# Patient Record
Sex: MALE | Race: WHITE | URBAN - METROPOLITAN AREA
[De-identification: names, ages, dates, MRNs, and addresses within clinical notes are randomized per-mention and may not be internally consistent; named-entity substitution may affect disease eponyms.]

---

## 2023-05-02 ENCOUNTER — OFFICE VISIT (OUTPATIENT)
Dept: URBAN - METROPOLITAN AREA CLINIC 81 | Facility: CLINIC | Age: 68
End: 2023-05-02
Payer: MEDICARE

## 2023-05-02 PROCEDURE — 99203 OFFICE O/P NEW LOW 30 MIN: CPT | Performed by: OPTOMETRIST

## 2023-05-02 RX ORDER — DOXYCYCLINE HYCLATE 100 MG/1
100 MG CAPSULE, GELATIN COATED ORAL
Qty: 60 | Refills: 0 | Status: INACTIVE
Start: 2023-05-02 | End: 2023-05-03

## 2023-05-02 RX ORDER — TOBRAMYCIN AND DEXAMETHASONE 3; 1 MG/ML; MG/ML
SUSPENSION/ DROPS OPHTHALMIC
Qty: 5 | Refills: 1 | Status: ACTIVE
Start: 2023-05-02

## 2023-05-02 ASSESSMENT — INTRAOCULAR PRESSURE
OD: 18
OS: 16

## 2023-05-02 NOTE — IMPRESSION/PLAN
Impression: Hordeolum internum right upper eyelid: H00.021.
-MGD OU, severe allergy symptoms
-active discharge from st today Plan: Discussed diagnosis with patient in detail. Eyelid hygiene with lid scrubs or diluted tear free Jigar and Jigar baby shampoo. Warm compresses BID-QID as directed. Start Tobradex OD BID x 7 days, sample of Zylet dispensed to patient to start OD QID, start Doxycycline PO 100mg BID x 2 week then QAM x 2 weeks and D/C. Will continue to observe condition and/or symptoms. Patient instructed to call if condition gets worse.

## 2023-05-03 ENCOUNTER — OFFICE VISIT (OUTPATIENT)
Dept: URBAN - METROPOLITAN AREA CLINIC 76 | Facility: CLINIC | Age: 68
End: 2023-05-03

## 2023-05-03 DIAGNOSIS — L03.213 PRESEPTAL CELLULITIS: ICD-10-CM

## 2023-05-03 DIAGNOSIS — H00.021 HORDEOLUM INTERNUM RIGHT UPPER EYELID: Primary | ICD-10-CM

## 2023-05-03 PROCEDURE — 99212 OFFICE O/P EST SF 10 MIN: CPT | Performed by: OPTOMETRIST

## 2023-05-03 RX ORDER — AMOXICILLIN AND CLAVULANATE POTASSIUM 875; 125 MG/1; MG/1
TABLET, FILM COATED ORAL
Qty: 20 | Refills: 0 | Status: ACTIVE
Start: 2023-05-03

## 2023-05-03 RX ORDER — AMOXICILLIN AND CLAVULANATE POTASSIUM 500; 125 MG/1; 1/1
TABLET, FILM COATED ORAL
Qty: 20 | Refills: 0 | Status: INACTIVE
Start: 2023-05-03 | End: 2023-05-03

## 2023-05-03 RX ORDER — NEOMYCIN SULFATE, POLYMYXIN B SULFATE AND DEXAMETHASONE 3.5; 10000; 1 MG/G; [USP'U]/G; MG/G
OINTMENT OPHTHALMIC
Qty: 3.5 | Refills: 1 | Status: ACTIVE
Start: 2023-05-03

## 2023-05-03 ASSESSMENT — INTRAOCULAR PRESSURE
OS: 16
OD: 18

## 2023-05-03 NOTE — IMPRESSION/PLAN
Impression: Hordeolum internum right upper eyelid: H00.021.
-MGD OU, severe allergy symptoms
-worsening with diffuse edema, yesterday edema was more localized Plan: Discussed diagnosis with patient in detail. Eyelid hygiene. Warm compresses BID-QID as directed. Continue Tobradex OD BID x 7 days, sample of Zylet dispensed to patient to continue OD QID. D/C Doxycycline for now. Start Augmentin /125mg BID. Start Maxitrol zoila OU QHS. Will continue to observe condition and/or symptoms. Patient instructed to call if condition gets worse.

## 2023-05-03 NOTE — IMPRESSION/PLAN
Impression: Preseptal cellulitis: Y08.098. Right. Plan: Same as plan 1. Start Augmentin /125mg as directed.